# Patient Record
Sex: FEMALE | Race: WHITE | NOT HISPANIC OR LATINO | URBAN - NONMETROPOLITAN AREA
[De-identification: names, ages, dates, MRNs, and addresses within clinical notes are randomized per-mention and may not be internally consistent; named-entity substitution may affect disease eponyms.]

---

## 2019-05-16 ENCOUNTER — IMPORTED ENCOUNTER (OUTPATIENT)
Dept: URBAN - NONMETROPOLITAN AREA CLINIC 1 | Facility: CLINIC | Age: 34
End: 2019-05-16

## 2019-05-16 PROBLEM — H52.223: Noted: 2019-05-16

## 2019-05-16 PROBLEM — H52.13: Noted: 2019-05-16

## 2019-05-16 PROCEDURE — 92015 DETERMINE REFRACTIVE STATE: CPT

## 2019-05-16 PROCEDURE — 92004 COMPRE OPH EXAM NEW PT 1/>: CPT

## 2019-05-16 PROCEDURE — 92310 CONTACT LENS FITTING OU: CPT

## 2019-05-16 NOTE — PATIENT DISCUSSION
"""Astigmatism-Discussed diagnosis with patient. Myopia-Discussed diagnosis with patient. -Explained that people who are myopic are at a higher risk for developing RD/RT and reviewed associated S&S.-Pt to contact our office if symptoms develop. Updated spec Rx given. Recommend lens that will provide comfort as well as protect safety and health of eyes. CL wear-CLs fit and center well.-Stressed that patient should not sleep in CL. -Updated CL Rx given. -CL care and precautions given. "

## 2020-08-31 ENCOUNTER — IMPORTED ENCOUNTER (OUTPATIENT)
Dept: URBAN - NONMETROPOLITAN AREA CLINIC 1 | Facility: CLINIC | Age: 35
End: 2020-08-31

## 2020-08-31 PROCEDURE — 92310 CONTACT LENS FITTING OU: CPT

## 2020-08-31 PROCEDURE — 92015 DETERMINE REFRACTIVE STATE: CPT

## 2020-08-31 PROCEDURE — 92014 COMPRE OPH EXAM EST PT 1/>: CPT

## 2021-08-31 ENCOUNTER — IMPORTED ENCOUNTER (OUTPATIENT)
Dept: URBAN - NONMETROPOLITAN AREA CLINIC 1 | Facility: CLINIC | Age: 36
End: 2021-08-31

## 2021-08-31 PROCEDURE — 92310 CONTACT LENS FITTING OU: CPT

## 2021-08-31 PROCEDURE — 92014 COMPRE OPH EXAM EST PT 1/>: CPT

## 2021-08-31 PROCEDURE — 92015 DETERMINE REFRACTIVE STATE: CPT

## 2021-08-31 NOTE — PATIENT DISCUSSION
Astigmatism-Discussed diagnosis with patient. Myopia-Discussed diagnosis with patient. -Explained that people who are myopic are at a higher risk for developing RD/RT and reviewed associated S&S.-Pt to contact our office if symptoms develop. Updated spec Rx given. Recommend lens that will provide comfort as well as protect safety and health of eyes. CL wear-CLs fit and center well.-Stressed that patient should not sleep in CL. -Updated CL Rx given. -CL care and precautions given.

## 2022-04-09 ASSESSMENT — TONOMETRY
OD_IOP_MMHG: 16
OD_IOP_MMHG: 16
OS_IOP_MMHG: 15
OS_IOP_MMHG: 16
OD_IOP_MMHG: 15
OS_IOP_MMHG: 16

## 2022-04-09 ASSESSMENT — VISUAL ACUITY
OU_CC: J1+
OD_SC: 20/25-1 BLURRY
OS_CC: CF4'
OS_CC: J1
OD_CC: CF4'
OD_CC: J1
OD_SC: 20/25
OU_SC: J1+
OS_SC: 20/20
OS_SC: 20/20

## 2022-08-19 NOTE — PATIENT DISCUSSION
Pt dx with Parkinsinsonism (symptoms but no full diagnosis), discussed impact on vision. Only having diplopia at night when watching TV.

## 2024-02-16 ENCOUNTER — ESTABLISHED PATIENT (OUTPATIENT)
Dept: RURAL CLINIC 1 | Facility: CLINIC | Age: 39
End: 2024-02-16

## 2024-02-16 DIAGNOSIS — H52.223: ICD-10-CM

## 2024-02-16 DIAGNOSIS — H52.13: ICD-10-CM

## 2024-02-16 PROCEDURE — 92015 DETERMINE REFRACTIVE STATE: CPT

## 2024-02-16 PROCEDURE — 92310-E CONTACT LENS FITTING ESTABLISH PATIENT

## 2024-02-16 PROCEDURE — 92014 COMPRE OPH EXAM EST PT 1/>: CPT

## 2024-02-16 ASSESSMENT — VISUAL ACUITY
OD_CC: 20/25
OS_CC: 20/20

## 2024-02-16 ASSESSMENT — TONOMETRY
OD_IOP_MMHG: 17
OS_IOP_MMHG: 17

## 2025-05-14 ENCOUNTER — COMPREHENSIVE EXAM (OUTPATIENT)
Age: 40
End: 2025-05-14

## 2025-05-14 DIAGNOSIS — H52.13: ICD-10-CM

## 2025-05-14 DIAGNOSIS — H52.223: ICD-10-CM

## 2025-05-14 PROCEDURE — 92015 DETERMINE REFRACTIVE STATE: CPT

## 2025-05-14 PROCEDURE — 92310-1 LEVEL 1 SOFT LENS UPDATE

## 2025-05-14 PROCEDURE — 92014 COMPRE OPH EXAM EST PT 1/>: CPT

## 2025-08-14 ENCOUNTER — EMERGENCY VISIT (OUTPATIENT)
Age: 40
End: 2025-08-14

## 2025-08-14 DIAGNOSIS — H10.011: ICD-10-CM

## 2025-08-14 PROCEDURE — 92012 INTRM OPH EXAM EST PATIENT: CPT

## 2025-08-18 ENCOUNTER — FOLLOW UP (OUTPATIENT)
Age: 40
End: 2025-08-18

## 2025-08-18 DIAGNOSIS — H10.011: ICD-10-CM

## 2025-08-18 PROCEDURE — 92012 INTRM OPH EXAM EST PATIENT: CPT
